# Patient Record
Sex: MALE | Race: WHITE | NOT HISPANIC OR LATINO | URBAN - METROPOLITAN AREA
[De-identification: names, ages, dates, MRNs, and addresses within clinical notes are randomized per-mention and may not be internally consistent; named-entity substitution may affect disease eponyms.]

---

## 2018-08-29 ENCOUNTER — OUTPATIENT (OUTPATIENT)
Dept: OUTPATIENT SERVICES | Facility: HOSPITAL | Age: 53
LOS: 1 days | Discharge: HOME | End: 2018-08-29

## 2018-08-29 DIAGNOSIS — N18.2 CHRONIC KIDNEY DISEASE, STAGE 2 (MILD): ICD-10-CM

## 2018-08-29 DIAGNOSIS — E11.9 TYPE 2 DIABETES MELLITUS WITHOUT COMPLICATIONS: ICD-10-CM

## 2018-08-29 DIAGNOSIS — E55.9 VITAMIN D DEFICIENCY, UNSPECIFIED: ICD-10-CM

## 2018-08-29 DIAGNOSIS — E61.1 IRON DEFICIENCY: ICD-10-CM

## 2018-08-29 DIAGNOSIS — N39.0 URINARY TRACT INFECTION, SITE NOT SPECIFIED: ICD-10-CM

## 2018-08-29 DIAGNOSIS — D64.9 ANEMIA, UNSPECIFIED: ICD-10-CM

## 2018-08-29 DIAGNOSIS — N40.0 BENIGN PROSTATIC HYPERPLASIA WITHOUT LOWER URINARY TRACT SYMPTOMS: ICD-10-CM

## 2018-08-29 DIAGNOSIS — E78.00 PURE HYPERCHOLESTEROLEMIA, UNSPECIFIED: ICD-10-CM

## 2018-08-29 DIAGNOSIS — K76.89 OTHER SPECIFIED DISEASES OF LIVER: ICD-10-CM

## 2018-08-29 DIAGNOSIS — D50.0 IRON DEFICIENCY ANEMIA SECONDARY TO BLOOD LOSS (CHRONIC): ICD-10-CM

## 2018-11-23 ENCOUNTER — OUTPATIENT (OUTPATIENT)
Dept: OUTPATIENT SERVICES | Facility: HOSPITAL | Age: 53
LOS: 1 days | Discharge: HOME | End: 2018-11-23

## 2018-11-23 DIAGNOSIS — Z00.00 ENCOUNTER FOR GENERAL ADULT MEDICAL EXAMINATION WITHOUT ABNORMAL FINDINGS: ICD-10-CM

## 2018-12-06 ENCOUNTER — OUTPATIENT (OUTPATIENT)
Dept: OUTPATIENT SERVICES | Facility: HOSPITAL | Age: 53
LOS: 1 days | Discharge: HOME | End: 2018-12-06

## 2018-12-06 DIAGNOSIS — R07.9 CHEST PAIN, UNSPECIFIED: ICD-10-CM

## 2018-12-06 DIAGNOSIS — R06.02 SHORTNESS OF BREATH: ICD-10-CM

## 2019-07-12 ENCOUNTER — OUTPATIENT (OUTPATIENT)
Dept: OUTPATIENT SERVICES | Facility: HOSPITAL | Age: 54
LOS: 1 days | Discharge: HOME | End: 2019-07-12

## 2019-07-12 DIAGNOSIS — K76.89 OTHER SPECIFIED DISEASES OF LIVER: ICD-10-CM

## 2019-07-12 DIAGNOSIS — E11.9 TYPE 2 DIABETES MELLITUS WITHOUT COMPLICATIONS: ICD-10-CM

## 2019-07-12 DIAGNOSIS — N40.0 BENIGN PROSTATIC HYPERPLASIA WITHOUT LOWER URINARY TRACT SYMPTOMS: ICD-10-CM

## 2019-07-12 DIAGNOSIS — N39.0 URINARY TRACT INFECTION, SITE NOT SPECIFIED: ICD-10-CM

## 2019-07-12 DIAGNOSIS — E03.9 HYPOTHYROIDISM, UNSPECIFIED: ICD-10-CM

## 2019-07-12 DIAGNOSIS — D50.0 IRON DEFICIENCY ANEMIA SECONDARY TO BLOOD LOSS (CHRONIC): ICD-10-CM

## 2019-07-12 DIAGNOSIS — E61.1 IRON DEFICIENCY: ICD-10-CM

## 2019-07-12 DIAGNOSIS — D64.9 ANEMIA, UNSPECIFIED: ICD-10-CM

## 2020-05-30 ENCOUNTER — TRANSCRIPTION ENCOUNTER (OUTPATIENT)
Age: 55
End: 2020-05-30

## 2021-05-20 ENCOUNTER — RESULT REVIEW (OUTPATIENT)
Age: 56
End: 2021-05-20

## 2021-05-20 ENCOUNTER — APPOINTMENT (OUTPATIENT)
Dept: UROLOGY | Facility: CLINIC | Age: 56
End: 2021-05-20
Payer: COMMERCIAL

## 2021-05-20 DIAGNOSIS — N40.1 BENIGN PROSTATIC HYPERPLASIA WITH LOWER URINARY TRACT SYMPMS: ICD-10-CM

## 2021-05-20 DIAGNOSIS — N13.8 BENIGN PROSTATIC HYPERPLASIA WITH LOWER URINARY TRACT SYMPMS: ICD-10-CM

## 2021-05-20 DIAGNOSIS — R97.20 ELEVATED PROSTATE, SPECIFIC ANTIGEN [PSA]: ICD-10-CM

## 2021-05-20 PROBLEM — Z00.00 ENCOUNTER FOR PREVENTIVE HEALTH EXAMINATION: Status: ACTIVE | Noted: 2021-05-20

## 2021-05-20 PROCEDURE — 99244 OFF/OP CNSLTJ NEW/EST MOD 40: CPT

## 2021-05-21 NOTE — HISTORY OF PRESENT ILLNESS
[FreeTextEntry1] : 57 yo with elevated PSA referred for assessment and plan of care\par the patient had a PSA of 5.61 on 3/2021\par in 2019 the PSA was 2.75 ng/ml\par \par he denies prior intervention\par he denies family history of prostate cancer\par he does have a family history of colon cancer\par \par IPSS 1\par IIEF 25\par \par no prostate or renal imaging available [None] : no symptoms

## 2021-05-21 NOTE — ASSESSMENT
[FreeTextEntry1] : 57 yo with PSA elevation\par no prior history of issues involving his prostate\par reviewed the significance of an elevated PSA\par discussed concerns regarding it association with prostate cancer\par also discussed concerns regarding unnecessary interventions for elevated PSA \par \par discussed the role of the 4K test\par discussed the role of an MRI\par \par the patient will have an MRI, 4K and renal and bladder US\par f/u with above

## 2021-06-04 ENCOUNTER — OUTPATIENT (OUTPATIENT)
Dept: OUTPATIENT SERVICES | Facility: HOSPITAL | Age: 56
LOS: 1 days | Discharge: HOME | End: 2021-06-04
Payer: COMMERCIAL

## 2021-06-04 DIAGNOSIS — R97.20 ELEVATED PROSTATE SPECIFIC ANTIGEN [PSA]: ICD-10-CM

## 2021-06-04 PROCEDURE — 76770 US EXAM ABDO BACK WALL COMP: CPT | Mod: 26

## 2021-06-16 LAB
4K SCORE CALCULATION: 3 %
FREE PSA: 1.24 NG/ML
PERCENT FREE PSA: 32 %
TOTAL PSA: 3.88 NG/ML

## 2021-06-17 ENCOUNTER — APPOINTMENT (OUTPATIENT)
Dept: UROLOGY | Facility: CLINIC | Age: 56
End: 2021-06-17
Payer: COMMERCIAL

## 2021-06-17 PROCEDURE — 99214 OFFICE O/P EST MOD 30 MIN: CPT

## 2021-06-21 PROBLEM — N40.1 BENIGN LOCALIZED HYPERPLASIA OF PROSTATE WITH URINARY OBSTRUCTION: Status: ACTIVE | Noted: 2021-06-21

## 2021-06-21 NOTE — LETTER BODY
[FreeTextEntry3] : Ngoc Metzger MD, FACS\par  [Dear  ___] : Dear  [unfilled], [Consult Letter:] : I had the pleasure of evaluating your patient, [unfilled]. [Please see my note below.] : Please see my note below. [Sincerely,] : Sincerely,

## 2021-06-21 NOTE — ASSESSMENT
[FreeTextEntry1] : 55 yo with low risk 4K and BPH\par discussed the need to compete assessment with an MRI of the prostate\par \par if the MRI is without any region of interest - will obtain repeat PSA in 6 months\par if abnormal regions identified\par will schedule at that time an MRI guided biopsy\par all questions answered

## 2021-06-21 NOTE — HISTORY OF PRESENT ILLNESS
[FreeTextEntry1] : 55 yo with elevated PSA referred for assessment and plan of care\par the patient had a PSA of 5.61 on 3/2021\par in 2019 the PSA was 2.75 ng/ml\par \par he denies prior intervention\par he denies family history of prostate cancer\par he does have a family history of colon cancer\par \par IPSS 1\par IIEF 25\par \par 4K 6/16/21\par 3%\par PSA 3.88 with 32% free PSA\par \par Renal and bladder US (6/04/2021)\par Prostate Enlargement 46 grams\par prevoid 314 ml, post void 15 ml\par no stones or masses

## 2021-07-12 ENCOUNTER — APPOINTMENT (OUTPATIENT)
Dept: UROLOGY | Facility: CLINIC | Age: 56
End: 2021-07-12
Payer: COMMERCIAL

## 2021-07-12 ENCOUNTER — OUTPATIENT (OUTPATIENT)
Dept: OUTPATIENT SERVICES | Facility: HOSPITAL | Age: 56
LOS: 1 days | Discharge: HOME | End: 2021-07-12
Payer: COMMERCIAL

## 2021-07-12 DIAGNOSIS — R93.49 ABNORMAL RADIOLOGIC FINDINGS ON DIAGNOSITIC IMAGING OF OTHER URINARY ORGANS: ICD-10-CM

## 2021-07-12 DIAGNOSIS — N41.1 CHRONIC PROSTATITIS: ICD-10-CM

## 2021-07-12 DIAGNOSIS — R97.20 ELEVATED PROSTATE SPECIFIC ANTIGEN [PSA]: ICD-10-CM

## 2021-07-12 DIAGNOSIS — R97.20 ELEVATED PROSTATE, SPECIFIC ANTIGEN [PSA]: ICD-10-CM

## 2021-07-12 PROCEDURE — 99214 OFFICE O/P EST MOD 30 MIN: CPT

## 2021-07-12 PROCEDURE — 72197 MRI PELVIS W/O & W/DYE: CPT | Mod: 26

## 2021-07-14 PROBLEM — R93.49 ABNORMAL FINDINGS ON DIAGNOSTIC IMAGING OF URINARY ORGANS: Status: ACTIVE | Noted: 2021-07-14

## 2021-07-14 PROBLEM — R97.20 ELEVATED PROSTATE SPECIFIC ANTIGEN (PSA): Status: ACTIVE | Noted: 2021-05-21

## 2021-07-14 PROBLEM — N41.1 CHRONIC PROSTATITIS: Status: ACTIVE | Noted: 2021-07-14

## 2021-07-14 NOTE — ASSESSMENT
[Chronic Prostatitis (601.1\N41.1)] : of ~T knee [FreeTextEntry1] : 55 yo with low risk 4K and BPH\par discussed the need to compete assessment with an MRI of the prostate\par \par  MRI BPH and sequela of prior prostatitis\par - will obtain a PSA in 1 year\par - all questions answered

## 2021-07-14 NOTE — HISTORY OF PRESENT ILLNESS
[None] : no symptoms [FreeTextEntry1] : 57 yo with history of elevated PSA referred for assessment and plan of care\par the patient had a PSA of 5.61 on 3/2021\par in 2019 the PSA was 2.75 ng/ml\par \par he denies prior intervention\par he denies family history of prostate cancer\par he does have a family history of colon cancer\par \par IPSS 1\par IIEF 25\par \par 4K 6/16/21\par 3%\par PSA 3.88 with 32% free PSA\par \par Renal and bladder US (6/04/2021)\par Prostate Enlargement 46 grams\par prevoid 314 ml, post void 15 ml\par no stones or masses\par \par MRI prostate 7/12/2021\par 43 grams\par PIRADS 2\par mild bilateral peripheral zone scarring - wedge shaped hypo intensities compatible with prior infections

## 2021-07-14 NOTE — LETTER BODY
[Dear  ___] : Dear  [unfilled], [Consult Letter:] : I had the pleasure of evaluating your patient, [unfilled]. [Please see my note below.] : Please see my note below. [Sincerely,] : Sincerely, [FreeTextEntry3] : Ngoc Metzger MD, FACS\par

## 2021-11-04 ENCOUNTER — OUTPATIENT (OUTPATIENT)
Dept: OUTPATIENT SERVICES | Facility: HOSPITAL | Age: 56
LOS: 1 days | Discharge: HOME | End: 2021-11-04
Payer: COMMERCIAL

## 2021-11-04 ENCOUNTER — RESULT REVIEW (OUTPATIENT)
Age: 56
End: 2021-11-04

## 2021-11-04 DIAGNOSIS — R97.20 ELEVATED PROSTATE SPECIFIC ANTIGEN [PSA]: ICD-10-CM

## 2021-11-04 PROCEDURE — 76770 US EXAM ABDO BACK WALL COMP: CPT | Mod: 26

## 2022-07-14 ENCOUNTER — APPOINTMENT (OUTPATIENT)
Dept: UROLOGY | Facility: CLINIC | Age: 57
End: 2022-07-14

## 2022-08-18 ENCOUNTER — APPOINTMENT (OUTPATIENT)
Dept: ORTHOPEDIC SURGERY | Facility: CLINIC | Age: 57
End: 2022-08-18

## 2022-08-18 VITALS — HEIGHT: 64 IN | BODY MASS INDEX: 42.68 KG/M2 | WEIGHT: 250 LBS

## 2022-08-18 PROCEDURE — 99072 ADDL SUPL MATRL&STAF TM PHE: CPT

## 2022-08-18 PROCEDURE — 99213 OFFICE O/P EST LOW 20 MIN: CPT

## 2022-08-18 NOTE — REASON FOR VISIT
[FreeTextEntry2] : follow up for work injury of 11/9/2020 to his back, neck and left side\par Still seeing pain management at a epidural lumbar spine Tuesday to soon to  weight is unchanged to 60 still taking Naprosyn pain travels into both legs he is doing therapy still 2 times a week

## 2022-08-18 NOTE — HISTORY OF PRESENT ILLNESS
[de-identified] : Patient Complaint - back pain hurting worse today more pain today in his left side of his neck and arm usually more to\par the right\par still in therapy taking Naprosyn\par History of Present Illness\par 57-year-old gentleman with back pain throughout his whole spine neck and lower back sharp in the lower back. Reports\par that his the legs get numb right more than left. Started in November 9, 2020 while working using a sledgehammer he is\par a  for [employer]\par under care of Neurology and pain management been doing physical therapy on Naprosyn using ice and heat has had\par some diagnostics has been out of work since May 25, 2021 current weight 254 pain management did a cervical injection\par 11/09/2021 \par endurance sitting about 20 minutes standing 15-20 minutes he can walk 2-3 blocks before he has to stop\par seeing  for elevated iron levels\par EMGs were done 05/28/2021\par still reports numbness hands and legs

## 2022-08-18 NOTE — ASSESSMENT
[FreeTextEntry1] :  patient is still struggling with his neck and back therapy is ongoing as his pain management support he still takes the Naprosyn I expressed my concern of the importance of weight reduction we did discuss a bariatric consultation is weight right now is 260 lb he remains totally disabled unable to work I will see him back in a few months

## 2022-08-18 NOTE — IMAGING
[de-identified] :  grossly intact neurologically cervical spine examination head position normal limits flexion extension and rotation there is some spasm no focal deficits shoulder motion is full on both sides reports pain radiating down more the right arm into the hand the spasm is in both the paraspinal and trapezius muscles\par \par Lumbar tight dorsal lumbar fascia and hamstrings negative straight leg negative bowstring bilaterally spasm in the back limits in flexion extension with pain hip motion is full bilaterally normal gait

## 2022-12-08 ENCOUNTER — APPOINTMENT (OUTPATIENT)
Dept: ORTHOPEDIC SURGERY | Facility: CLINIC | Age: 57
End: 2022-12-08

## 2022-12-08 DIAGNOSIS — S33.5XXD SPRAIN OF LIGAMENTS OF LUMBAR SPINE, SUBSEQUENT ENCOUNTER: ICD-10-CM

## 2022-12-08 PROCEDURE — 99072 ADDL SUPL MATRL&STAF TM PHE: CPT

## 2022-12-08 PROCEDURE — 99213 OFFICE O/P EST LOW 20 MIN: CPT

## 2022-12-08 NOTE — REASON FOR VISIT
[FreeTextEntry2] : follow up for work injury of 11/9/2020 to his neck, back and left side The weight is elevated 267 lb using a cane  the left leg is killing him with numbness occasionally taking Naprosyn  no recent injections   no recent therapy

## 2022-12-08 NOTE — HISTORY OF PRESENT ILLNESS
[de-identified] : Patient Complaint - back pain hurting worse today more pain today in his left side of his neck and arm usually more to\par the right\par still in therapy taking Naprosyn\par History of Present Illness\par 57-year-old gentleman with back pain throughout his whole spine neck and lower back sharp in the lower back. Reports\par that his the legs get numb right more than left. Started in November 9, 2020 while working using a sledgehammer he is\par a  for [employer]\par under care of Neurology and pain management been doing physical therapy on Naprosyn using ice and heat has had\par some diagnostics has been out of work since May 25, 2021 current weight 254 pain management did a cervical injection\par 11/09/2021 \par endurance sitting about 20 minutes standing 15-20 minutes he can walk 2-3 blocks before he has to stop\par seeing  for elevated iron levels\par EMGs were done 05/28/2021\par still reports numbness hands and legs

## 2022-12-08 NOTE — IMAGING
[de-identified] :  grossly intact neurologically cervical spine examination head position normal limits flexion extension and rotation there is some spasm no focal deficits shoulder motion is full on both sides reports pain radiating down more the right arm into the hand the spasm is in both the paraspinal and trapezius muscles\par \par Lumbar tight dorsal lumbar fascia and hamstrings negative straight leg negative bowstring bilaterally spasm in the back limits in flexion extension with pain hip motion is full bilaterally normal gait

## 2022-12-08 NOTE — ASSESSMENT
[FreeTextEntry1] :  patient is still struggling with his neck and back therapy  has not been effective  continue his pain management support he still takes the Naprosyn I expressed my concern of the importance of weight reduction we did discuss a bariatric consultation is weight right now is 267 lb he remains totally disabled unable to work I will see him back in a few months

## 2023-03-09 ENCOUNTER — APPOINTMENT (OUTPATIENT)
Dept: ORTHOPEDIC SURGERY | Facility: CLINIC | Age: 58
End: 2023-03-09

## 2023-04-04 ENCOUNTER — APPOINTMENT (OUTPATIENT)
Dept: ORTHOPEDIC SURGERY | Facility: CLINIC | Age: 58
End: 2023-04-04
Payer: OTHER MISCELLANEOUS

## 2023-04-04 PROCEDURE — 99213 OFFICE O/P EST LOW 20 MIN: CPT

## 2023-04-04 NOTE — ASSESSMENT
[FreeTextEntry1] :  patient is still struggling with his neck and back his physical therapy  has not been effective  continue his pain management support he still takes the Naprosyn I  again expressed my concern of the importance of weight reduction we did discuss a bariatric consultation is weight right now is 267 lb he remains totally disabled unable to work I will see him back in a few months

## 2023-04-04 NOTE — IMAGING
[de-identified] :  grossly intact neurologically cervical spine examination head position normal limits flexion extension and rotation there is some spasm no focal deficits shoulder motion is full on both sides reports pain radiating down more the right arm into the hand the spasm is in both the paraspinal and trapezius muscles\par \par Lumbar tight dorsal lumbar fascia and hamstrings negative straight leg negative bowstring bilaterally spasm in the back limits in flexion extension with pain hip motion is full bilaterally normal gait

## 2023-04-04 NOTE — HISTORY OF PRESENT ILLNESS
[de-identified] : Patient Complaint - back pain hurting worse today more pain today in his left side of his neck and arm usually more to\par the right\par still in therapy taking Naprosyn\par History of Present Illness\par 57-year-old gentleman with back pain throughout his whole spine neck and lower back sharp in the lower back. Reports\par that his the legs get numb right more than left. Started in November 9, 2020 while working using a sledgehammer he is\par a  for [employer]\par under care of Neurology and pain management been doing physical therapy on Naprosyn using ice and heat has had\par some diagnostics has been out of work since May 25, 2021 current weight 254 pain management did a cervical injection\par 11/09/2021 \par endurance sitting about 20 minutes standing 15-20 minutes he can walk 2-3 blocks before he has to stop\par seeing  for elevated iron levels\par EMGs were done 05/28/2021\par still reports numbness hands and legs

## 2023-07-17 ENCOUNTER — APPOINTMENT (OUTPATIENT)
Dept: ORTHOPEDIC SURGERY | Facility: CLINIC | Age: 58
End: 2023-07-17
Payer: OTHER MISCELLANEOUS

## 2023-07-17 VITALS — WEIGHT: 252 LBS | BODY MASS INDEX: 43.26 KG/M2

## 2023-07-17 PROCEDURE — 99213 OFFICE O/P EST LOW 20 MIN: CPT

## 2023-07-17 NOTE — IMAGING
[de-identified] :  grossly intact neurologically cervical spine examination head position normal limits flexion extension and rotation there is some spasm no focal deficits shoulder motion is full on both sides reports pain radiating down more the right arm into the hand the spasm is in both the paraspinal and trapezius muscles\par \par Lumbar tight dorsal lumbar fascia and hamstrings negative straight leg negative bowstring bilaterally spasm in the back limits in flexion extension with pain hip motion is full bilaterally normal gait

## 2023-07-17 NOTE — ASSESSMENT
[FreeTextEntry1] :  patient is still struggling with his neck and back     physical therapy  has not been effective he  continues his pain management support    he still takes the Naprosyn I  again expressed my concern of the importance of weight reduction we did discuss a bariatric consultation is weight right now is 252 lb he remains totally disabled unable to work I will see him back in a few months

## 2023-07-17 NOTE — HISTORY OF PRESENT ILLNESS
[de-identified] : Patient Complaint - back pain hurting worse today more pain today in his left side of his neck and arm usually more to\par the right\par still in therapy taking Naprosyn\par History of Present Illness\par 57-year-old gentleman with back pain throughout his whole spine neck and lower back sharp in the lower back. Reports\par that his the legs get numb right more than left. Started in November 9, 2020 while working using a sledgehammer he is\par a  for [employer]\par under care of Neurology and pain management been doing physical therapy on Naprosyn using ice and heat has had\par some diagnostics has been out of work since May 25, 2021 current weight 254 pain management did a cervical injection\par 11/09/2021 \par endurance sitting about 20 minutes standing 15-20 minutes he can walk 2-3 blocks before he has to stop\par seeing  for elevated iron levels\par EMGs were done 05/28/2021\par still reports numbness hands and legs

## 2023-07-17 NOTE — REASON FOR VISIT
[FreeTextEntry2] : Patient is coming in for a follow up WC DOI 11/09/2020 neck,back. left side  still takes Naprosyn occasionally weight is coming down to 152 lb doing therapy still use a cane   pain level is unchanged

## 2023-10-14 ENCOUNTER — APPOINTMENT (OUTPATIENT)
Dept: ORTHOPEDIC SURGERY | Facility: CLINIC | Age: 58
End: 2023-10-14
Payer: OTHER MISCELLANEOUS

## 2023-10-14 PROCEDURE — 99455 WORK RELATED DISABILITY EXAM: CPT

## 2024-01-10 ENCOUNTER — APPOINTMENT (OUTPATIENT)
Dept: ORTHOPEDIC SURGERY | Facility: CLINIC | Age: 59
End: 2024-01-10

## 2024-03-22 ENCOUNTER — APPOINTMENT (OUTPATIENT)
Dept: ORTHOPEDIC SURGERY | Facility: CLINIC | Age: 59
End: 2024-03-22
Payer: OTHER MISCELLANEOUS

## 2024-03-22 DIAGNOSIS — M54.16 RADICULOPATHY, LUMBAR REGION: ICD-10-CM

## 2024-03-22 PROCEDURE — 99213 OFFICE O/P EST LOW 20 MIN: CPT

## 2024-03-22 NOTE — IMAGING
[de-identified] :  grossly intact neurologically cervical spine examination head position normal limits flexion extension and rotation there is some spasm no focal deficits shoulder motion is full on both sides reports pain radiating down more the right arm into the hand the spasm is in both the paraspinal and trapezius muscles  Lumbar tight dorsal lumbar fascia and hamstrings negative straight leg negative bowstring bilaterally spasm in the back limits in flexion extension with pain hip motion is full bilaterally normal gait EMGs upper extremities 5/28/2021 left C5 radiculopathy, bilateral carpal and cubital tunnel syndrome  MRI cervical spine 8/14/2020 [facility]  C3-4 and 4-5  several disc bulges  MRI lumbar spine 1/12/2021 [facility] L 2-3  3-4  4-5 5-S1

## 2024-03-22 NOTE — REASON FOR VISIT
[FreeTextEntry2] : work injury of 11/09/2020 Still with pain in his neck and back using his cane takes Naprosyn on occasion no change in the pain level

## 2024-03-22 NOTE — ASSESSMENT
[FreeTextEntry1] : patient is still struggling with his neck and back physical therapy has not been effective he continues his pain management support he still takes the Naprosyn I again expressed my concern of the importance of weight reduction we did discuss a bariatric consultation is weight right now is 250 lb  I will see him back in a few months. He is totally disabled unable to work

## 2024-03-22 NOTE — HISTORY OF PRESENT ILLNESS
[de-identified] : Patient Complaint - back pain hurting worse today more pain today in his left side of his neck and arm usually more to\par  the right\par  still in therapy taking Naprosyn\par  History of Present Illness\par  57-year-old gentleman with back pain throughout his whole spine neck and lower back sharp in the lower back. Reports\par  that his the legs get numb right more than left. Started in November 9, 2020 while working using a sledgehammer he is\par  a  for [employer]\par  under care of Neurology and pain management been doing physical therapy on Naprosyn using ice and heat has had\par  some diagnostics has been out of work since May 25, 2021 current weight 254 pain management did a cervical injection\par  11/09/2021 \par  endurance sitting about 20 minutes standing 15-20 minutes he can walk 2-3 blocks before he has to stop\par  seeing  for elevated iron levels\par  EMGs were done 05/28/2021\par  still reports numbness hands and legs

## 2024-06-20 ENCOUNTER — APPOINTMENT (OUTPATIENT)
Dept: ORTHOPEDIC SURGERY | Facility: CLINIC | Age: 59
End: 2024-06-20
Payer: OTHER MISCELLANEOUS

## 2024-06-20 DIAGNOSIS — S13.9XXD SPRAIN OF JOINTS AND LIGAMENTS OF UNSPECIFIED PARTS OF NECK, SUBSEQUENT ENCOUNTER: ICD-10-CM

## 2024-06-20 DIAGNOSIS — M50.90 CERVICAL DISC DISORDER, UNSPECIFIED, UNSPECIFIED CERVICAL REGION: ICD-10-CM

## 2024-06-20 DIAGNOSIS — M51.9 UNSPECIFIED THORACIC, THORACOLUMBAR AND LUMBOSACRAL INTERVERTEBRAL DISC DISORDER: ICD-10-CM

## 2024-06-20 DIAGNOSIS — M51.26 OTHER INTERVERTEBRAL DISC DISPLACEMENT, LUMBAR REGION: ICD-10-CM

## 2024-06-20 DIAGNOSIS — M54.12 RADICULOPATHY, CERVICAL REGION: ICD-10-CM

## 2024-06-20 PROCEDURE — 99213 OFFICE O/P EST LOW 20 MIN: CPT

## 2024-06-20 RX ORDER — NAPROXEN 500 MG/1
500 TABLET ORAL
Qty: 60 | Refills: 2 | Status: ACTIVE | COMMUNITY
Start: 2024-06-20 | End: 1900-01-01

## 2024-06-21 PROBLEM — M51.9 LUMBAR DISC DISEASE: Status: ACTIVE | Noted: 2022-08-18

## 2024-06-21 PROBLEM — S13.9XXD CERVICAL SPRAIN, SUBSEQUENT ENCOUNTER: Status: ACTIVE | Noted: 2022-08-18

## 2024-06-21 PROBLEM — M51.26 LUMBAR HERNIATED DISC: Status: ACTIVE | Noted: 2022-08-18

## 2024-06-21 PROBLEM — M50.90 CERVICAL DISC DISEASE: Status: ACTIVE | Noted: 2022-08-18

## 2024-06-21 PROBLEM — M54.12 CERVICAL RADICULOPATHY, ACUTE: Status: ACTIVE | Noted: 2022-08-18

## 2024-06-21 NOTE — HISTORY OF PRESENT ILLNESS
[de-identified] : Patient Complaint - back pain hurting worse today more pain today in his left side of his neck and arm usually more to\par  the right\par  still in therapy taking Naprosyn\par  History of Present Illness\par  57-year-old gentleman with back pain throughout his whole spine neck and lower back sharp in the lower back. Reports\par  that his the legs get numb right more than left. Started in November 9, 2020 while working using a sledgehammer he is\par  a  for [employer]\par  under care of Neurology and pain management been doing physical therapy on Naprosyn using ice and heat has had\par  some diagnostics has been out of work since May 25, 2021 current weight 254 pain management did a cervical injection\par  11/09/2021 \par  endurance sitting about 20 minutes standing 15-20 minutes he can walk 2-3 blocks before he has to stop\par  seeing  for elevated iron levels\par  EMGs were done 05/28/2021\par  still reports numbness hands and legs

## 2024-06-21 NOTE — ASSESSMENT
[FreeTextEntry1] : patient is still struggling with his cervical and lumbar spine physical therapy has not been effective he continues his pain management support  he still takes the Naprosyn I again expressed my concern of the importance of weight reduction we did discuss a bariatric consultation is weight right now is still  250 lb  I will see him back in a few months. He is totally disabled unable to work

## 2024-06-21 NOTE — REASON FOR VISIT
[FreeTextEntry2] : work injury of 11/9/2020 back and neck WC 11/9/20  still has a cane   using naprosyn

## 2024-06-21 NOTE — IMAGING
[de-identified] :  grossly intact neurologically cervical spine examination head position normal limits flexion extension and rotation there is some spasm no focal deficits shoulder motion is full on both sides reports pain radiating down more the right arm into the hand the spasm is in both the paraspinal and trapezius muscles  Lumbar tight dorsal lumbar fascia and hamstrings negative straight leg negative bowstring bilaterally spasm in the back limits in flexion extension with pain hip motion is full bilaterally normal gait EMGs upper extremities 5/28/2021 left C5 radiculopathy, bilateral carpal and cubital tunnel syndrome  MRI cervical spine 8/14/2020 [facility]  C3-4 and 4-5  several disc bulges  MRI lumbar spine 1/12/2021 [facility] L 2-3  3-4  4-5 5-S1

## 2024-09-27 ENCOUNTER — RX RENEWAL (OUTPATIENT)
Age: 59
End: 2024-09-27

## 2024-12-12 ENCOUNTER — APPOINTMENT (OUTPATIENT)
Dept: ORTHOPEDIC SURGERY | Facility: CLINIC | Age: 59
End: 2024-12-12
Payer: OTHER MISCELLANEOUS

## 2024-12-12 DIAGNOSIS — M51.26 OTHER INTERVERTEBRAL DISC DISPLACEMENT, LUMBAR REGION: ICD-10-CM

## 2024-12-12 DIAGNOSIS — S13.9XXD SPRAIN OF JOINTS AND LIGAMENTS OF UNSPECIFIED PARTS OF NECK, SUBSEQUENT ENCOUNTER: ICD-10-CM

## 2024-12-12 DIAGNOSIS — M54.16 RADICULOPATHY, LUMBAR REGION: ICD-10-CM

## 2024-12-12 DIAGNOSIS — S33.5XXD SPRAIN OF LIGAMENTS OF LUMBAR SPINE, SUBSEQUENT ENCOUNTER: ICD-10-CM

## 2024-12-12 DIAGNOSIS — M51.9 UNSPECIFIED THORACIC, THORACOLUMBAR AND LUMBOSACRAL INTERVERTEBRAL DISC DISORDER: ICD-10-CM

## 2024-12-12 DIAGNOSIS — M50.90 CERVICAL DISC DISORDER, UNSPECIFIED, UNSPECIFIED CERVICAL REGION: ICD-10-CM

## 2024-12-12 DIAGNOSIS — M54.12 RADICULOPATHY, CERVICAL REGION: ICD-10-CM

## 2024-12-12 PROCEDURE — 99213 OFFICE O/P EST LOW 20 MIN: CPT

## 2025-01-20 ENCOUNTER — RX RENEWAL (OUTPATIENT)
Age: 60
End: 2025-01-20

## 2025-02-25 ENCOUNTER — OUTPATIENT (OUTPATIENT)
Dept: OUTPATIENT SERVICES | Facility: HOSPITAL | Age: 60
LOS: 1 days | End: 2025-02-25
Payer: COMMERCIAL

## 2025-02-25 DIAGNOSIS — M79.605 PAIN IN LEFT LEG: ICD-10-CM

## 2025-02-25 PROCEDURE — 73552 X-RAY EXAM OF FEMUR 2/>: CPT | Mod: LT

## 2025-02-25 PROCEDURE — 93970 EXTREMITY STUDY: CPT

## 2025-02-25 PROCEDURE — 93970 EXTREMITY STUDY: CPT | Mod: 26

## 2025-02-25 PROCEDURE — 73552 X-RAY EXAM OF FEMUR 2/>: CPT | Mod: 26,LT

## 2025-02-26 DIAGNOSIS — M79.605 PAIN IN LEFT LEG: ICD-10-CM

## 2025-03-03 ENCOUNTER — APPOINTMENT (OUTPATIENT)
Dept: ORTHOPEDIC SURGERY | Facility: CLINIC | Age: 60
End: 2025-03-03
Payer: MEDICARE

## 2025-03-03 DIAGNOSIS — M51.26 OTHER INTERVERTEBRAL DISC DISPLACEMENT, LUMBAR REGION: ICD-10-CM

## 2025-03-03 DIAGNOSIS — M54.16 RADICULOPATHY, LUMBAR REGION: ICD-10-CM

## 2025-03-03 PROCEDURE — 99213 OFFICE O/P EST LOW 20 MIN: CPT

## 2025-04-10 ENCOUNTER — APPOINTMENT (OUTPATIENT)
Dept: ORTHOPEDIC SURGERY | Facility: CLINIC | Age: 60
End: 2025-04-10
Payer: OTHER MISCELLANEOUS

## 2025-04-10 DIAGNOSIS — M54.16 RADICULOPATHY, LUMBAR REGION: ICD-10-CM

## 2025-04-10 DIAGNOSIS — M76.32 ILIOTIBIAL BAND SYNDROME, LEFT LEG: ICD-10-CM

## 2025-04-10 DIAGNOSIS — M51.9 UNSPECIFIED THORACIC, THORACOLUMBAR AND LUMBOSACRAL INTERVERTEBRAL DISC DISORDER: ICD-10-CM

## 2025-04-10 DIAGNOSIS — M51.26 OTHER INTERVERTEBRAL DISC DISPLACEMENT, LUMBAR REGION: ICD-10-CM

## 2025-04-10 PROCEDURE — 99213 OFFICE O/P EST LOW 20 MIN: CPT

## 2025-04-11 PROBLEM — M76.32 ILIOTIBIAL BAND TENDINITIS OF LEFT SIDE: Status: ACTIVE | Noted: 2025-04-11

## 2025-04-12 ENCOUNTER — RX RENEWAL (OUTPATIENT)
Age: 60
End: 2025-04-12

## 2025-07-16 ENCOUNTER — RX RENEWAL (OUTPATIENT)
Age: 60
End: 2025-07-16

## 2025-08-06 ENCOUNTER — APPOINTMENT (OUTPATIENT)
Dept: ORTHOPEDIC SURGERY | Facility: CLINIC | Age: 60
End: 2025-08-06